# Patient Record
Sex: MALE | Race: WHITE
[De-identification: names, ages, dates, MRNs, and addresses within clinical notes are randomized per-mention and may not be internally consistent; named-entity substitution may affect disease eponyms.]

---

## 2017-09-29 NOTE — DIAGNOSTIC IMAGING REPORT
R FINGER(S) MIN 2 VIEWS ROUTINE



HISTORY:  83 years-old Male RIGHT 5TH, CRUSH INJURY W/LACERATION acute crush

injury with laceration of the distal fifth digit.



COMPARISON: None available.



TECHNIQUE: 3 views of the right fifth digit.



FINDINGS: 

There is moderate soft tissue swelling about the distal fifth digit with skin

defect and subcutaneous emphysema compatible with penetrating trauma.

Additionally, there is an acute transverse displaced fracture of the fifth

distal phalangeal tuft displaced medially 3 mm and distally 3 mm. No opaque

foreign body.



Moderate to severe interphalangeal degenerative changes are noted throughout the

fifth digit. No additional acute fracture or dislocation. Bones are mildly

demineralized.



IMPRESSION: 

1. Acute mildly displaced transverse fracture of the fifth distal phalangeal

tuft.

2. Soft tissue swelling with areas of subcutaneous emphysema about the fifth

digit compatible with history of penetrating trauma. Correlate clinically to

exclude nailbed injury. No opaque foreign body. 







The above report was generated using voice recognition software. It may contain

grammatical, syntax or spelling errors.







Electronically signed by:  Domenic Phillips M.D.

9/29/2017 6:25 PM



Dictated Date/Time:  9/29/2017 6:23 PM

## 2017-09-29 NOTE — EMERGENCY ROOM VISIT NOTE
ED Visit Note


First contact with patient:  17:20


CHIEF COMPLAINT: Crush injury right fifth finger 2 hours ago





HISTORY OF PRESENT ILLNESS: Patient is a right-hand-dominant 83-year-old white 

male who was operating a log splitter, and accidentally crushed the right fifth 

finger in the splitter.  He was wearing a glove at the time.  He notes a 

laceration to the tip of the right second finger.  He reports that it is not 

particularly painful.  Bleeding has been controlled.  He rates his pain a 0/10.

  He denies any numbness finger.  


  


REVIEW OF SYSTEMS: Review of systems as per HPI.  All other systems reviewed 

were negative.  At least 6 systems reviewed.





PMH: Electronic medical records are reviewed and summarized as above/below.  

See Problem List.  Last tetanus is unknown.





SOCIAL HISTORY:  Patient lives at home with his wife.  Former smoker.


  


PHYSICAL EXAM: Vital Signs: Reviewed Nurse's notes.  Examination of the right 

fifth finger show a small, 0.5 cm laceration on the palmar finger pad.  He has 

a larger, 4 cm laceration through the dorsal aspect of the finger, through the 

midportion of the nailbed.  The nail has been dislodged from the base, is still 

attached to the nailbed distally.  There is no subungual hematoma.  I cannot 

palpate or visualize distal phalanx in the base of the wound. The edges gape 

apart with traction.  There is no foreign material in the wound and it looks 

clean. There is scant bleeding.  Patient is able to flex and extend at the DIP 

joint, although minimally.  Sensation is intact.





EMERGENCY DEPARTMENT COURSE: The patient was seen and evaluated as above.  His 

old records were reviewed.  He declined any medication for discomfort.  X-rays 

of the right fifth finger were obtained.  His tetanus was updated.  He appears 

to have a chronic, arthritic deformity at the DIP joints preceding the injury.  

I do not appreciate any tendinous disruption.  His sensation is intact, and I 

do not suspect nerve injury.





X-ray findings are consistent with a tuft fracture.





Using sterile technique, the finger was scrubbed with Betadine.  A 2:1 mixture 

of 1% plain buffered lidocaine and 0.5% Sensorcaine were used and digital block 

was performed.  When the finger was adequately anesthetized, it was re-scrubbed 

with Betadine and redraped.  The laceration was irrigated copiously using 250 

mL of normal saline solution.  A total of 12, 5-0 nylon sutures were used to 

repair the laceration, then 2, 4-0 nylon sutures were used to secure the nail 

back to the nail bed/root.  Wounds were then cleansed and dressed and he was 

placed in a fingertip cage.  Given the open fracture, he will be placed on 

Keflex.  He was given a Norco home pack for pain.  Wound care measures were 

outlined.  He was discharged home in good condition with his wife.  He rated 

his pain a 0/10 at discharge.





Medication reconciliation: I attest that I have personally reviewed the patient'

s current medication list.  





Blood pressure screening : Patient was found to have normal blood pressure on 

screening and does not require follow-up.


 


R FINGER(S) MIN 2 VIEWS ROUTINE





HISTORY:  83 years-old Male RIGHT 5TH, CRUSH INJURY W/LACERATION acute crush


injury with laceration of the distal fifth digit.





COMPARISON: None available.





TECHNIQUE: 3 views of the right fifth digit.





FINDINGS: 


There is moderate soft tissue swelling about the distal fifth digit with skin


defect and subcutaneous emphysema compatible with penetrating trauma.


Additionally, there is an acute transverse displaced fracture of the fifth


distal phalangeal tuft displaced medially 3 mm and distally 3 mm. No opaque


foreign body.





Moderate to severe interphalangeal degenerative changes are noted throughout the


fifth digit. No additional acute fracture or dislocation. Bones are mildly


demineralized.





IMPRESSION: 


1. Acute mildly displaced transverse fracture of the fifth distal phalangeal


tuft.


2. Soft tissue swelling with areas of subcutaneous emphysema about the fifth


digit compatible with history of penetrating trauma. Correlate clinically to


exclude nailbed injury. No opaque foreign body.


Problem List


Medical Problems:


(1) Benign hypertension


Status: Chronic  





(2) Calculus Of Kidney


Status: Resolved  





(3) Cardiac angina


Status: Resolved  





(4) Chest pain


Status: Resolved  





(5) Diabetes mellitus


Status: Chronic  





(6) Heart disease


Status: Chronic  





(7) Hyperlipidemia, Unspecified


Status: Chronic  





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


Status: Chronic  





(9) NSTEMI (non-ST elevated myocardial infarction)


Status: Resolved  





(10) Precordial chest pain


Status: Resolved  





Surgical Problems:


(1) Cardiac bypass


Status: Resolved  





(2) Orthopedic sugery


Status: Resolved  











Current/Historical Medications


Scheduled


Aspirin (Aspirin Chewable), 81 MG PO QAM


Atorvastatin (Lipitor), 40 MG PO HS


Cephalexin Monohydrate (Keflex), 500 MG PO QID


Clopidogrel (Plavix), 75 MG PO QAM


Cyanocobalamin (Vitamin B12), 1 TAB PO QAM


Gabapentin (Gabapentin), 1-2 TAB PO HS


Glimepiride (Glimepiride), 1 TAB PO QAM


Isosorbide Mononitrate Ext Rel (Imdur Ext Rel), 60 MG PO QAM


Metoprolol Succ (Toprol Xl) (Toprol-Xl), 50 MG PO QAM


Nitroglycerin (Nitrostat), 0.4 MG UT PRN


Ranolazine (Ranexa), 1 TAB PO BID


Rivastigmine (Rivastigmine Transdermal), 1 PATCH TD UD


Ropinirole HCl (Ropinirole HCl), 1 TAB PO HS


Tamsulosin HCl (Tamsulosin HCl), 1 TAB PO DAILY





Allergies


Coded Allergies:  


     No Known Allergies (Unverified , 9/29/17)





Vital Signs











  Date Time  Temp Pulse Resp B/P (MAP) Pulse Ox O2 Delivery O2 Flow Rate FiO2


 


9/29/17 19:26  69 16 156/82 95 Room Air  


 


9/29/17 17:15 36.7 67 15 127/67 95 Room Air  











Medications Administered











 Medications


  (Trade)  Dose


 Ordered  Sig/Leigha


 Route  Start Time


 Stop Time Status Last Admin


Dose Admin


 


 Diphtheria/


 Pertussis/Tetanus


 Vacc


  (Adacel Inj)  0.5 ml  ONCE ONCE


 IM.  9/29/17 17:45


 9/29/17 17:46 DC 9/29/17 18:03


0.5 ML


 


 Cephalexin


 Monohydrate


  (Keflex 500MG


 Home Pack)  1 homepack  NOW  ONCE


 PO  9/29/17 19:30


 9/29/17 19:31 DC 9/29/17 19:30


1 HOMEPACK


 


 Acetaminophen/


 Hydrocodone Bitart


  (Norco 5/325mg


 Home Pack)  1 homepack  UD  ONCE


 PO  9/29/17 19:30


 9/29/17 19:31 DC 9/29/17 19:30


1 HOMEPACK











Departure Information


Impression





 Primary Impression:  


 Open fracture of tuft of distal phalanx of finger


 Additional Impression:  


 Finger laceration with complication





Prescriptions





Cephalexin Monohydrate (KEFLEX) 500 Mg Cap


500 MG PO QID, #40 CAP


   Prov: Mandi Hopper PA         9/29/17





Referrals


No Doctor, Assigned (PCP)





Patient Instructions


My [facility]





Additional Instructions





Keep wound clean and dry.  Do not allow any crusting or dried blood to 

accumulate on sutures.  Clean gently with mild soap and water daily.  Use an 

antibiotic ointment for 3-4 days, then let wound dry.  Wear the metal finger 

splint for support and protection until sutures are removed.  Suture removal in 

12-14 days.  Return immediately for any signs of infection (increasing redness, 

swelling, drainage).





Ice and elevate for swelling and pain.  





Acetaminophen(Tylenol) may be used for fever or pain.  Use 1000mg every six 

hours as needed.  Avoid using more than 3000mg in a 24 hour period.  





Hydrocodone/Acetaminophen (Norco) 5/325 mg: Take 1-2 pills every four hours for 

breakthrough pain.  Avoid alcohol, operating machinery or dangerous equipment, 

working on ladders or roofs, DRIVING, or situations where being under the 

influence may be dangerous.  It is recommended to use an over-the-counter stool 

softener such as Colace, 100mg twice daily while taking this medication to 

avoid constipation.





Problem Qualifiers








 Additional Impression:  


 Finger laceration with complication


 Encounter type:  initial encounter  Qualified Codes:  S61.219A - Laceration 

without foreign body of unspecified finger without damage to nail, initial 

encounter

## 2018-02-21 ENCOUNTER — HOSPITAL ENCOUNTER (OUTPATIENT)
Dept: HOSPITAL 45 - C.NUCL | Age: 83
Discharge: HOME | End: 2018-02-21
Attending: INTERNAL MEDICINE
Payer: COMMERCIAL

## 2018-02-21 DIAGNOSIS — R07.89: ICD-10-CM

## 2018-02-21 DIAGNOSIS — I25.10: ICD-10-CM

## 2018-02-21 DIAGNOSIS — E78.5: ICD-10-CM

## 2018-02-21 DIAGNOSIS — I10: ICD-10-CM

## 2018-02-21 DIAGNOSIS — R93.1: Primary | ICD-10-CM

## 2018-02-21 DIAGNOSIS — Z95.1: ICD-10-CM

## 2018-02-21 DIAGNOSIS — R06.09: ICD-10-CM

## 2018-02-21 NOTE — MYOCARDIAL PERFUSION STUDY
Myocardial Perfusion Study Rpt


Myocardial Perfusion Study Rpt


Date of Service


02/21/2018


Myocardial Perfusion Study Rpt


Procedure:


1. Myocardial perfusion study performed in multiple views/images


2. Lexiscan pharmacologic stress ECG





Indications:


1. Chest pain


2.  CAD


3. Dyspnea with exertion


4. CABG





Consent:  Informed written consent was obtained prior to the procedure.


Ordering physician:  Dr. Ambriz





Procedural details:  


For the stress portion of the study, Lexiscan 0.4 mg was intravenously 

administered followed by a saline flush.  This was followed by 30 mCi of 

technetium 99m Cardiolite, injected at 11:05 a.m. on 02/21/2018.  30 minutes 

following the injection, imaging of the heart was performed in multiple 

projections.  For the rest portion of the study, 11.1 mCi technetium 99m 

Cardiolite was injected intravenously at 9:36 a.m. on 02/21/2018.  1 hour 

following the injection, imaging of the heart was performed in the same 

projections.





Lexiscan stress ECG:





Resting ECG demonstrated:  Sinus bradycardia at 59 bpm.  PVC.  Right bundle-

branch block.


Maximum heart rate:  78 bpm


Resting blood pressure:  131/61 mmHg


Maximum blood pressure:  134/60 mmHg


Maximal, age-predicted heart rate:  57 %


Significant ST changes:  None


Arrhythmia:  None 


Symptoms:  No chest pain or shortness of breath.  No symptoms reported.





Findings:


Rotating raw imaging demonstrated no significant lung uptake.  There is no 

significant motion artifact.  Heart size appeared normal.  Myocardial perfusion 

demonstrated a small area of mildly reduced uptake involving the inferolateral 

and inferior wall from base to apex which appeared fixed in post stress and 

rest imaging.  There were no significant reversible defects to suggest ischemia.





Ejection fraction:  67 %


Wall motion:  Normal


No significant transient ischemic dilation.





Impression:


1. Negative myocardial perfusion study for ischemic changes.


2.  Fixed inferolateral and inferior wall defect may represent diaphragmatic 

attenuation given normal wall motion.


3. Normal left ventricular systolic function.  EF 67%.


4. Normal wall motion.


5. No chest pain or shortness of breath reported.


6. Nondiagnostic Lexiscan ECG.

## 2018-02-28 ENCOUNTER — HOSPITAL ENCOUNTER (OUTPATIENT)
Dept: HOSPITAL 45 - C.LABMFLN | Age: 83
Discharge: HOME | End: 2018-02-28
Attending: INTERNAL MEDICINE
Payer: COMMERCIAL

## 2018-02-28 DIAGNOSIS — E78.5: ICD-10-CM

## 2018-02-28 DIAGNOSIS — I25.10: ICD-10-CM

## 2018-02-28 DIAGNOSIS — E11.22: ICD-10-CM

## 2018-02-28 DIAGNOSIS — I12.9: ICD-10-CM

## 2018-02-28 DIAGNOSIS — Z95.1: ICD-10-CM

## 2018-02-28 DIAGNOSIS — N18.3: Primary | ICD-10-CM

## 2018-02-28 DIAGNOSIS — R07.89: ICD-10-CM

## 2018-02-28 DIAGNOSIS — R06.09: ICD-10-CM

## 2018-02-28 LAB
ALBUMIN SERPL-MCNC: 3.3 GM/DL (ref 3.4–5)
BUN SERPL-MCNC: 27 MG/DL (ref 7–18)
CALCIUM SERPL-MCNC: 8.8 MG/DL (ref 8.5–10.1)
CO2 SERPL-SCNC: 30 MMOL/L (ref 21–32)
CREAT SERPL-MCNC: 1.88 MG/DL (ref 0.6–1.4)
GLUCOSE SERPL-MCNC: 143 MG/DL (ref 70–99)
HBA1C MFR BLD: 7.1 % (ref 4.5–5.6)
KETONES UR QL STRIP: 33 MG/DL
PH UR: 115 MG/DL (ref 0–200)
PHOSPHATE SERPL-MCNC: 3.1 MG/DL (ref 2.5–4.9)
POTASSIUM SERPL-SCNC: 4.7 MMOL/L (ref 3.5–5.1)
SODIUM SERPL-SCNC: 137 MMOL/L (ref 136–145)

## 2018-05-22 ENCOUNTER — HOSPITAL ENCOUNTER (OUTPATIENT)
Dept: HOSPITAL 45 - C.LABMFLN | Age: 83
Discharge: HOME | End: 2018-05-22
Attending: FAMILY MEDICINE
Payer: COMMERCIAL

## 2018-05-22 DIAGNOSIS — E11.9: Primary | ICD-10-CM

## 2018-05-22 LAB
BASOPHILS # BLD: 0.06 K/UL (ref 0–0.2)
BASOPHILS NFR BLD: 0.8 %
EOS ABS #: 0.13 K/UL (ref 0–0.5)
EOSINOPHIL NFR BLD AUTO: 204 K/UL (ref 130–400)
HBA1C MFR BLD: 7.6 % (ref 4.5–5.6)
HCT VFR BLD CALC: 41.4 % (ref 42–52)
HGB BLD-MCNC: 14 G/DL (ref 14–18)
IG#: 0.08 K/UL (ref 0–0.02)
IMM GRANULOCYTES NFR BLD AUTO: 23.1 %
LYMPHOCYTES # BLD: 1.73 K/UL (ref 1.2–3.4)
MCH RBC QN AUTO: 31.7 PG (ref 25–34)
MCHC RBC AUTO-ENTMCNC: 33.8 G/DL (ref 32–36)
MCV RBC AUTO: 93.7 FL (ref 80–100)
MONO ABS #: 0.6 K/UL (ref 0.11–0.59)
MONOCYTES NFR BLD: 8 %
NEUT ABS #: 4.9 K/UL (ref 1.4–6.5)
NEUTROPHILS # BLD AUTO: 1.7 %
NEUTROPHILS NFR BLD AUTO: 65.3 %
PMV BLD AUTO: 9.8 FL (ref 7.4–10.4)
RED CELL DISTRIBUTION WIDTH CV: 13.2 % (ref 11.5–14.5)
RED CELL DISTRIBUTION WIDTH SD: 45.9 FL (ref 36.4–46.3)
WBC # BLD AUTO: 7.5 K/UL (ref 4.8–10.8)

## 2020-11-02 NOTE — EMERGENCY ROOM VISIT NOTE
ED Visit Note


First contact with patient:  19:24


This Patient was discussed with the physician Assistant, Vida Hopper PA-C.  

The pertinent historical and physical exam findings were confirmed.  I agree 

with the studies ordered and with the interpretations of these studies.  I 

agree with the disposition and care plan. Left arm;

## 2022-07-22 NOTE — MAMMOGRAPHY REPORT
Final Anesthesia Post-op Assessment    Patient: Chris Contreras  Procedure(s) Performed: COLONOSCOPY  Anesthesia type: MAC    Vitals Value Taken Time   Temp 37 °C (98.6 °F) 07/22/22 1209   Pulse 68 07/22/22 1209   Resp 16 07/22/22 1209   SpO2 95 % 07/22/22 1209   /73 07/22/22 1209         Patient Location: Phase II  Post-op Vital Signs:stable  Level of Consciousness: awake and alert  Respiratory Status: spontaneous ventilation  Cardiovascular stable  Hydration: euvolemic  Pain Management: adequately controlled  Handoff: Handoff to receiving clinician was performed and questions were answered  Vomiting: none  Nausea: None  Airway Patency:patent  Post-op Assessment: no complications and patient tolerated procedure well with no complications      No complications documented.    MALE BILATERAL DIGITAL DIAGNOSTIC MAMMOGRAM WITH CAD AND TARGETED LEFT ULTRASOUND: 9/13/2017

CLINICAL HISTORY: The patient reports focal tenderness in the left breast for approximately 2 months.
  He denies any palpable lumps, nipple discharge, or other complaints.  





TECHNIQUE:  Current study was also evaluated with a Computer Aided Detection (CAD) system.  Bilateral
 CC and MLO views were obtained.  



COMPARISON: No prior exams were available for comparison.   



BREAST COMPOSITION:  The tissue of both breasts is prominently fatty.



FINDINGS: A triangle marker marks the site of focal tenderness in the left upper outer breast periare
olar region.  There is fibroglandular tissue within the left subareolar breast, consistent with gynec
omastia.  A minimal amount of fibroglandular tissue is also seen within the right subareolar breast. 
 No suspicious masses, calcifications, or areas of architectural distortion are noted in either breas
t.



Targeted ultrasound was performed of the area of tenderness pointed out by the patient in the left la
teral periareolar breast, with ultrasound also performed of the left subareolar breast.  Mixed echoge
nicity tissue is seen within these regions, which has the sonographic appearance of gynecomastia.  No
 suspicious masses or other suspicious sonographic abnormalities are evident.



IMPRESSION:  ACR BI-RADS CATEGORY 2: BENIGN, TARGETED ULTRASOUND ACR BI-RADS CATEGORY 2: BENIGN 

The area of left breast tenderness pointed out by the patient corresponds with benign gynecomastia.  
There is no mammographic or targeted sonographic evidence of malignancy.  Recommend clinical follow-u
p as to a possible underlying cause.  



The patient has been verbally notified of the results.  





Approximately 10% of breast cancers are not detected with mammography. A negative mammographic report
 should not delay biopsy if a clinically suggestive mass is present.



Rosario Hughes M.D.          

/:9/13/2017 09:32:17  



Imaging Technologist: Syeda ABEBE)(JANI), [facility]

letter sent: Normal 1/2  

BI-RADS Code: ACR BI-RADS Category 2: Benign  Ultrasound BI-RADS: ACR BI-RADS Category 2: Benign